# Patient Record
Sex: MALE | Race: WHITE | HISPANIC OR LATINO | Employment: UNEMPLOYED | URBAN - METROPOLITAN AREA
[De-identification: names, ages, dates, MRNs, and addresses within clinical notes are randomized per-mention and may not be internally consistent; named-entity substitution may affect disease eponyms.]

---

## 2023-09-01 ENCOUNTER — HOSPITAL ENCOUNTER (EMERGENCY)
Facility: HOSPITAL | Age: 23
Discharge: HOME | End: 2023-09-01
Attending: EMERGENCY MEDICINE
Payer: MEDICARE

## 2023-09-01 ENCOUNTER — APPOINTMENT (EMERGENCY)
Dept: RADIOLOGY | Facility: HOSPITAL | Age: 23
End: 2023-09-01
Payer: MEDICARE

## 2023-09-01 VITALS
DIASTOLIC BLOOD PRESSURE: 81 MMHG | RESPIRATION RATE: 20 BRPM | HEART RATE: 70 BPM | TEMPERATURE: 97.9 F | SYSTOLIC BLOOD PRESSURE: 146 MMHG | OXYGEN SATURATION: 99 %

## 2023-09-01 DIAGNOSIS — S92.152A AVULSION FRACTURE OF LEFT TALUS: Primary | ICD-10-CM

## 2023-09-01 PROCEDURE — 99283 EMERGENCY DEPT VISIT LOW MDM: CPT

## 2023-09-01 PROCEDURE — 99284 EMERGENCY DEPT VISIT MOD MDM: CPT | Performed by: PHYSICIAN ASSISTANT

## 2023-09-01 PROCEDURE — 29515 APPLICATION SHORT LEG SPLINT: CPT | Performed by: PHYSICIAN ASSISTANT

## 2023-09-01 PROCEDURE — 73610 X-RAY EXAM OF ANKLE: CPT

## 2023-09-01 NOTE — Clinical Note
Korin Lilly was seen and treated in our emergency department on 9/1/2023. may use crutches at work    Diagnosis:     Jeri Fitzgerald  may return to work on return date. He may return on this date: 09/02/2023         If you have any questions or concerns, please don't hesitate to call.       Mary Fernandez PA-C    ______________________________           _______________          _______________  Hospital Representative                              Date                                Time

## 2023-09-02 NOTE — ED PROVIDER NOTES
History  Chief Complaint   Patient presents with   • Ankle Pain     Pt reports he was playing basketball yesterday when he landed wrong and rolled his left ankle. Pt reports numbness of left ankle. 21yo male who presents to ER for evaluation of left ankle injury. Today while playing basketball he rolled inwards. Patient states he has a history of spraining his ankle multiple times in the past.  Patient is unable to bear weight on it, he has been using crutches. He took Tylenol which helps some. Pain radiated toward his knee yesterday this has since improved some. Admits to some swelling. Denies foot pain. Denies decree strength or sensation of the foot. Denies previous left ankle fracture or surgery. History provided by:  Patient      None       History reviewed. No pertinent past medical history. History reviewed. No pertinent surgical history. History reviewed. No pertinent family history. I have reviewed and agree with the history as documented. E-Cigarette/Vaping     E-Cigarette/Vaping Substances     Social History     Tobacco Use   • Smoking status: Never   • Smokeless tobacco: Never   Substance Use Topics   • Alcohol use: Never   • Drug use: Never       Review of Systems   Constitutional: Negative for chills and fever. Musculoskeletal: Positive for joint swelling. Skin: Negative for rash and wound. Neurological: Negative for weakness and numbness. Physical Exam  Physical Exam  Vitals and nursing note reviewed. Constitutional:       Appearance: Normal appearance. He is well-developed. HENT:      Head: Atraumatic. Eyes:      Conjunctiva/sclera: Conjunctivae normal.   Cardiovascular:      Pulses: Normal pulses. Musculoskeletal:      Left knee: Normal.      Left lower leg: Normal.      Left ankle: Swelling (mild) present. Tenderness present over the lateral malleolus and medial malleolus. No base of 5th metatarsal or proximal fibula tenderness.  Decreased range of motion. Normal pulse. Left Achilles Tendon: Normal.      Left foot: Normal.   Skin:     General: Skin is warm and dry. Capillary Refill: Capillary refill takes less than 2 seconds. Neurological:      Mental Status: He is alert. Psychiatric:         Mood and Affect: Mood normal.         Vital Signs  ED Triage Vitals [09/01/23 1957]   Temperature Pulse Respirations Blood Pressure SpO2   97.9 °F (36.6 °C) 70 20 146/81 99 %      Temp Source Heart Rate Source Patient Position - Orthostatic VS BP Location FiO2 (%)   Oral -- Sitting Left arm --      Pain Score       --           Vitals:    09/01/23 1957   BP: 146/81   Pulse: 70   Patient Position - Orthostatic VS: Sitting         Visual Acuity      ED Medications  Medications - No data to display    Diagnostic Studies  Results Reviewed     None                 XR ankle 3+ views LEFT   ED Interpretation by Parris Da Silva PA-C (09/01 2043)   ? Avulsion fracture of talus                 Procedures  Splint application    Date/Time: 9/1/2023 8:49 PM    Performed by: Parris Da Silva PA-C  Authorized by: Parris Da Silva PA-C  Universal Protocol:  Procedure performed by: Yohana Owen)  Consent given by: patient  Patient understanding: patient states understanding of the procedure being performed  Patient identity confirmed: verbally with patient      Pre-procedure details:     Sensation:  Normal  Procedure details:     Laterality:  Left    Location:  Ankle    Splint type:  Short leg    Supplies:  Ortho-Glass, cotton padding and elastic bandage  Post-procedure details:     Pain:  Improved    Sensation:  Normal    Patient tolerance of procedure: Tolerated well, no immediate complications             ED Course                                             Medical Decision Making  Differential diagnosis includes but is not limited to: Fracture versus sprain, will x-ray    Amount and/or Complexity of Data Reviewed  Radiology: ordered.           Disposition  Final diagnoses: Avulsion fracture of left talus     Time reflects when diagnosis was documented in both MDM as applicable and the Disposition within this note     Time User Action Codes Description Comment    9/1/2023  8:48 PM Anitha Bhta Add [K53.773L] Avulsion fracture of left talus       ED Disposition     ED Disposition   Discharge    Condition   Stable    Date/Time   Fri Sep 1, 2023  8:48 PM    Comment   Terrace Petite discharge to home/self care. Follow-up Information     Follow up With Specialties Details Why Contact Info Additional [address] Orthopedic Surgery In 3 days  [address]  [place] [address]  [address] [place] [address], [address] [place], [place], [address] [phone]    [facility] Emergency Department Emergency Medicine  If symptoms worsen [address] [id number] [address] [facility] Emergency Department          Patient's Medications    No medications on file       No discharge procedures on file.     PDMP Review     None          ED Provider  Electronically Signed by           Ayden Ocampo PA-C  09/01/23 5619

## 2023-09-02 NOTE — ED NOTES
This tech applied a short leg posterior to the left leg of this Pt. Pt handled application well with no signs of distress and educated on care of the splint.       [facility]  09/01/23 2057

## 2023-09-07 VITALS — WEIGHT: 167 LBS | SYSTOLIC BLOOD PRESSURE: 122 MMHG | HEART RATE: 72 BPM | DIASTOLIC BLOOD PRESSURE: 72 MMHG

## 2023-09-07 DIAGNOSIS — S93.492A SPRAIN OF ANTERIOR TALOFIBULAR LIGAMENT OF LEFT ANKLE, INITIAL ENCOUNTER: ICD-10-CM

## 2023-09-07 DIAGNOSIS — S92.152A CLOSED DISPLACED AVULSION FRACTURE OF LEFT TALUS, INITIAL ENCOUNTER: Primary | ICD-10-CM

## 2023-09-07 PROCEDURE — 99203 OFFICE O/P NEW LOW 30 MIN: CPT | Performed by: EMERGENCY MEDICINE

## 2023-09-07 RX ORDER — ALBUTEROL SULFATE 90 UG/1
2 AEROSOL, METERED RESPIRATORY (INHALATION) EVERY 6 HOURS PRN
COMMUNITY

## 2023-09-07 NOTE — PROGRESS NOTES
Assessment/Plan:    Diagnoses and all orders for this visit:    Closed displaced avulsion fracture of left talus, initial encounter  -     Ankle Cude ankle/Ankle Brace    Sprain of anterior talofibular ligament of left ankle, initial encounter  -     Ankle Cude ankle/Ankle Brace    Other orders  -     albuterol (PROVENTIL HFA,VENTOLIN HFA) 90 mcg/act inhaler; Inhale 2 puffs every 6 (six) hours as needed for wheezing    Lace up ankle brace  Ankle sprain exercises and information provided  Work note provided  Reviewed ER note and Xray imaging    Return in about 4 weeks (around 10/5/2023). 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, evaluated in ER 9/1 Xrays obtained provided posterior splint and utilizing knee scooter, has been working at [address] [place] [address],[address]    The following portions of the patient's chart were reviewed and updated as appropriate: Allergy:    Allergies   Allergen Reactions   • Ibuprofen Hives       Medications:    Current Outpatient Medications:   •  albuterol (PROVENTIL HFA,VENTOLIN HFA) 90 mcg/act inhaler, Inhale 2 puffs every 6 (six) hours as needed for wheezing, Disp: , Rfl:     Patient Active Problem List   Diagnosis   • Closed displaced avulsion fracture of left talus   • Sprain of anterior talofibular ligament of left ankle       Objective:  /72   Pulse 72   Wt 75.8 kg (167 lb)     Left Ankle Exam     Tenderness   The patient is experiencing tenderness in the ATF (dorsal talus). Swelling: mild    Range of Motion   Eversion: abnormal   Inversion: abnormal     Other   Erythema: absent  Sensation: normal  Pulse: present            Physical Exam      Neurologic Exam    Procedures    I have personally reviewed pertinent films in PACS. and I have personally reviewed the written report of the pertinent studies. History reviewed.  No pertinent past medical history. History reviewed. No pertinent surgical history. Social History     Socioeconomic History   • Marital status: Single     Spouse name: Not on file   • Number of children: Not on file   • Years of education: Not on file   • Highest education level: Not on file   Occupational History   • Not on file   Tobacco Use   • Smoking status: Never   • Smokeless tobacco: Never   Substance and Sexual Activity   • Alcohol use: Never   • Drug use: Never   • Sexual activity: Not on file   Other Topics Concern   • Not on file   Social History Narrative   • Not on file     Social Determinants of Health     Financial Resource Strain: Not on file   Food Insecurity: Not on file   Transportation Needs: Not on file   Physical Activity: Not on file   Stress: Not on file   Social Connections: Not on file   Intimate Partner Violence: Not on file   Housing Stability: Not on file       History reviewed. No pertinent family history.

## 2023-09-07 NOTE — PATIENT INSTRUCTIONS
You may use Advil (ibuprofen) 600mg every 6 hours or at least twice per day OR Aleve (naproxen) 250-500mg every 12 hours as needed for pain and inflammation. You may also take Tylenol 500mg every 4-6 hours as needed OR max 1,000mg per dose up to 3 times per day for a total of 3,000mg per day  Check with your primary care physician to see if these medications are safe to take and to make sure they do not interfere with your other medications and medical issues. Ankle Sprain   AMBULATORY CARE:   An ankle sprain  happens when 1 or more ligaments in your ankle joint stretch or tear. Ligaments are tough tissues that connect bones. Ligaments support your joints and keep your bones in place. Common symptoms include the following:   Trouble moving your ankle or foot    Pain when you touch or put weight on your ankle    Bruised, swollen, or misshapen ankle  Seek care immediately if:   You have severe pain in your ankle. Your foot or toes are cold or numb. Your ankle becomes more weak or unstable (wobbly). You are unable to put any weight on your ankle or foot. Your swelling has increased or returned. Contact your healthcare provider if:   Your pain does not go away, even after treatment. You have questions or concerns about your condition or care. Treatment:   Medicines      NSAIDs , such as ibuprofen, help decrease swelling, pain, and fever. This medicine is available with or without a doctor's order. NSAIDs can cause stomach bleeding or kidney problems in certain people. If you take blood thinner medicine, always ask your healthcare provider if NSAIDs are safe for you. Always read the medicine label and follow directions. Acetaminophen  decreases pain. It is available without a doctor's order. Ask how much to take and how often to take it. Follow directions. Acetaminophen can cause liver damage if not taken correctly. Prescription pain medicine  may be given.  Ask how to take this medicine safely. Surgery  may be needed to repair or replace a torn ligament if your sprain does not heal with other treatments. Your healthcare provider may use screws to attach the bones in your ankle together. The screws may help support your ankle and make it stable. Ask your healthcare provider for more information about surgery to treat your ankle sprain. Self care:   Use support devices,  such as a brace, cast, or splint, may be needed to limit your movement and protect your joint. You may need to use crutches to decrease your pain as you move around. Go to physical therapy as directed. A physical therapist teaches you exercises to help improve movement and strength, and to decrease pain. Rest  your ankle so that it can heal. Return to normal activities as directed. Apply ice on your ankle for 15 to 20 minutes every hour or as directed. Use an ice pack, or put crushed ice in a plastic bag. Cover it with a towel. Ice helps prevent tissue damage and decreases swelling and pain. Compress  your ankle. Ask if you should wrap an elastic bandage around your injured ligament. An elastic bandage provides support and helps decrease swelling and movement so your joint can heal. Wear as long as directed. Elevate  your ankle above the level of your heart as often as you can. This will help decrease swelling and pain. Prop your ankle on pillows or blankets to keep it elevated comfortably. Prevent another ankle sprain:   Let your ankle heal.  Find out how long your ligament needs to heal. Do not do any physical activity until your healthcare provider says it is okay. If you start activity too soon, you may develop a more serious injury. Always warm up and stretch  before you exercise or play sports. Use the right equipment. Always wear shoes that fit well and are made for the activity that you are doing. You may also need ankle supports, elbow and knee pads, or braces.   Follow up with your healthcare provider as directed:  Write down your questions so you remember to ask them during your visits. © 2017 [facility] [employer] Information is for End User's use only and may not be sold, redistributed or otherwise used for commercial purposes. All illustrations and images included in CareNotes® are the copyrighted property of [employer]. or [employer]in [employer]. The above information is an  only. It is not intended as medical advice for individual conditions or treatments. Talk to your doctor, nurse or pharmacist before following any medical regimen to see if it is safe and effective for you. Ankle Exercises   AMBULATORY CARE:   What you need to know about ankle exercises: Ankle exercises help strengthen your ankle and improve its function after injury. These are beginning exercises. Ask your healthcare provider if you need to see a physical therapist for more advanced exercises. Do these exercises 3 to 5 days a week , or as directed by your healthcare provider. Ask if you should perform the exercises on each ankle. Do the exercises in the order that your healthcare provider recommends. This will help prevent swelling, chronic pain, and reinjury. Start with range of motion exercises. Then progress to strengthening exercises, and finally to balancing exercises. Warm up before you do ankle exercises. Walk or ride a stationary bike for 5 to 10 minutes to prepare your ankle for movement. Stop if you feel pain. It is normal to feel some discomfort at first. Regular exercise will help decrease your discomfort over time. How to perform range of motion exercises safely:  Begin with range of motion exercises to improve flexibility. Ask your healthcare provider when you can progress to strengthening exercises. Ankle alphabet:  Sit on a chair so that your feet do not touch the floor. Use your big toe to write each letter of the alphabet.  Use only your foot and ankle, and keep your movements small. Do 2 sets. Calf stretches:      Sitting calf stretches with a towel:  Sit on the floor with both legs out straight in front of you. Loop a towel around the ball of your injured foot. Grasp the ends of the towel and pull it toward you. Keep your leg and back straight. Do not lean forward as you pull the towel. Hold for 30 seconds. Then relax for 30 seconds. Do 2 sets of 10. Standing calf stretches:  Stand facing a wall with the foot that is not injured forward and your knee slightly bent. Keep the leg with the injured foot straight and behind you with your toes pointed in slightly. With both heels flat on the floor, press your hips forward. Do not arch your back. Hold for 30 seconds, and then relax for 30 seconds. Do 2 sets of 10. Repeat with your leg bent. Do 2 sets of 10. How to perform strengthening exercises safely:  After you can perform range of motion exercises without pain, you may begin strengthening exercises. Ask your healthcare provider when you can progress to balancing exercises. Ankle movement in 4 directions:  Sit on the floor with your legs straight in front of you. Keep your heels on the floor for support. Dorsiflexion:  Begin with your toes pointing straight up. Pull your toes toward your body. Slowly return to the starting position. Do 3 sets of 5. Plantar flexion:  Begin with your toes pointing straight up. Push your toes away from your body. Slowly return to the starting position. Do 3 sets of 5. Inversion:  Begin with your toes pointing straight up. Push your toes inward, toward each other. Slowly return to the starting position. Do 3 sets of 5. Eversion:  Begin with your toes pointing straight up. Push your toes outward, away from each other. Slowly return to the starting position. Do 3 sets of 5. Toe curls with a towel:  Sit on a chair so that both of your feet are flat on the floor.  Place a small towel on the floor in front of your injured foot. Grab the center of the towel with your toes and curl the towel toward you. Relax and repeat. Do 1 set of 5. Marina pick-ups:  Sit on a chair so that both of your feet are flat on the floor. Place 20 marbles on the floor in front of your injured foot. Use your toes to  one marble at a time and place it into a bowl. Repeat until you have picked up all the marbles. Do 1 set. Heel raises:      Single leg heel raises:  Stand with your weight evenly on both feet. Hold on to a chair or a wall for balance. Lift the foot that is not injured off the floor so all your weight is placed on your injured foot. Raise the heel of your injured foot as high as you can. Slowly lower your heel to the floor. Do 1 set of 10. Double leg heel raises:  Stand with your weight evenly on both feet. Hold on to a chair or a wall for balance. Raise both of your heels as high as you can. Slowly lower your heels to the floor. Do 1 set of 10. Heel and toe walks:      Heel walks:  Begin in a standing position. Lift your toes off the floor and walk on your heels. Keep your toes lifted as high as possible. Do 2 sets of 10. Toe walks:  Begin in a standing position. Lift your heels off the floor and walk on the balls and toes of your feet. Keep your heels lifted as high as possible. Do 2 sets of 10. How to perform a balance exercise safely:  After you can perform strengthening exercises without pain, you may do this beginning balancing exercise. Ask your healthcare provider for more advanced balance exercises. Single leg stance:  Stand with your weight evenly on both feet, or hold on to a chair or a wall. Do not lean to the side. Lift the foot that is not injured off the floor so all your weight is placed on your injured foot. Balance on your injured foot. Ask your healthcare provider how long to hold this position.            Contact your healthcare provider if: Your pain becomes worse. You have new pain. You have questions or concerns about your condition, care, or exercise program.  © 2017 [facility] [employer] Information is for End User's use only and may not be sold, redistributed or otherwise used for commercial purposes. All illustrations and images included in CareNotes® are the copyrighted property of [facility], [employer]. or Davin Acosta. The above information is an  only. It is not intended as medical advice for individual conditions or treatments. Talk to your doctor, nurse or pharmacist before following any medical regimen to see if it is safe and effective for you.

## 2023-09-07 NOTE — LETTER
September 7, 2023     Patient: Sagrario Bravo  YOB: 2000  Date of Visit: 9/7/2023      To Whom it May Concern:    Sagrario Bravo is under my professional care. Linh Wilson was seen in my office on 9/7/2023. Linh Wilson may weight bear as tolerated and return to work, please allow ankle brace and knee scooter as needed. May drive. F/U 4 weeks. If you have any questions or concerns, please don't hesitate to call.          Sincerely,          Lanie Mcgee MD        CC: No Recipients

## 2024-11-18 ENCOUNTER — HOSPITAL ENCOUNTER (EMERGENCY)
Facility: HOSPITAL | Age: 24
Discharge: HOME | End: 2024-11-18

## 2024-11-18 VITALS
HEART RATE: 86 BPM | DIASTOLIC BLOOD PRESSURE: 83 MMHG | OXYGEN SATURATION: 100 % | TEMPERATURE: 97.8 F | WEIGHT: 167.55 LBS | SYSTOLIC BLOOD PRESSURE: 119 MMHG | RESPIRATION RATE: 18 BRPM

## 2024-11-18 DIAGNOSIS — R11.2 NAUSEA AND VOMITING: ICD-10-CM

## 2024-11-18 DIAGNOSIS — R10.84 ABDOMINAL PAIN, GENERALIZED: Primary | ICD-10-CM

## 2024-11-18 DIAGNOSIS — R19.7 DIARRHEA: ICD-10-CM

## 2024-11-18 LAB
ALBUMIN SERPL BCG-MCNC: 5.4 G/DL (ref 3.5–5)
ALP SERPL-CCNC: 66 U/L (ref 34–104)
ALT SERPL W P-5'-P-CCNC: 17 U/L (ref 7–52)
ANION GAP SERPL CALCULATED.3IONS-SCNC: 10 MMOL/L (ref 4–13)
AST SERPL W P-5'-P-CCNC: 18 U/L (ref 13–39)
BILIRUB SERPL-MCNC: 2.12 MG/DL (ref 0.2–1)
BUN SERPL-MCNC: 20 MG/DL (ref 5–25)
CALCIUM SERPL-MCNC: 10.4 MG/DL (ref 8.4–10.2)
CHLORIDE SERPL-SCNC: 100 MMOL/L (ref 96–108)
CO2 SERPL-SCNC: 28 MMOL/L (ref 21–32)
CREAT SERPL-MCNC: 0.92 MG/DL (ref 0.6–1.3)
ERYTHROCYTE [DISTWIDTH] IN BLOOD BY AUTOMATED COUNT: 11.8 % (ref 11.6–15.1)
GFR SERPL CREATININE-BSD FRML MDRD: 117 ML/MIN/1.73SQ M
GLUCOSE SERPL-MCNC: 95 MG/DL (ref 65–140)
HCT VFR BLD AUTO: 52.9 % (ref 36.5–49.3)
HGB BLD-MCNC: 18.2 G/DL (ref 12–17)
LIPASE SERPL-CCNC: 14 U/L (ref 11–82)
MCH RBC QN AUTO: 31.1 PG (ref 26.8–34.3)
MCHC RBC AUTO-ENTMCNC: 34.4 G/DL (ref 31.4–37.4)
MCV RBC AUTO: 90 FL (ref 82–98)
PLATELET # BLD AUTO: 294 THOUSANDS/UL (ref 149–390)
PMV BLD AUTO: 9.7 FL (ref 8.9–12.7)
POTASSIUM SERPL-SCNC: 3.8 MMOL/L (ref 3.5–5.3)
PROT SERPL-MCNC: 9.2 G/DL (ref 6.4–8.4)
RBC # BLD AUTO: 5.86 MILLION/UL (ref 3.88–5.62)
SODIUM SERPL-SCNC: 138 MMOL/L (ref 135–147)
WBC # BLD AUTO: 11.61 THOUSAND/UL (ref 4.31–10.16)

## 2024-11-18 PROCEDURE — 96374 THER/PROPH/DIAG INJ IV PUSH: CPT

## 2024-11-18 PROCEDURE — 99284 EMERGENCY DEPT VISIT MOD MDM: CPT

## 2024-11-18 PROCEDURE — 96361 HYDRATE IV INFUSION ADD-ON: CPT

## 2024-11-18 PROCEDURE — 99283 EMERGENCY DEPT VISIT LOW MDM: CPT

## 2024-11-18 PROCEDURE — 80053 COMPREHEN METABOLIC PANEL: CPT

## 2024-11-18 PROCEDURE — 36415 COLL VENOUS BLD VENIPUNCTURE: CPT

## 2024-11-18 PROCEDURE — 83690 ASSAY OF LIPASE: CPT

## 2024-11-18 PROCEDURE — 85025 COMPLETE CBC W/AUTO DIFF WBC: CPT

## 2024-11-18 RX ORDER — LOPERAMIDE HYDROCHLORIDE 2 MG/1
2 CAPSULE ORAL 4 TIMES DAILY PRN
Qty: 12 CAPSULE | Refills: 0 | Status: SHIPPED | OUTPATIENT
Start: 2024-11-18

## 2024-11-18 RX ORDER — ONDANSETRON 2 MG/ML
4 INJECTION INTRAMUSCULAR; INTRAVENOUS ONCE
Status: COMPLETED | OUTPATIENT
Start: 2024-11-18 | End: 2024-11-18

## 2024-11-18 RX ORDER — LOPERAMIDE HYDROCHLORIDE 2 MG/1
4 CAPSULE ORAL ONCE
Status: COMPLETED | OUTPATIENT
Start: 2024-11-18 | End: 2024-11-18

## 2024-11-18 RX ORDER — DICYCLOMINE HCL 20 MG
20 TABLET ORAL ONCE
Status: COMPLETED | OUTPATIENT
Start: 2024-11-18 | End: 2024-11-18

## 2024-11-18 RX ORDER — ONDANSETRON 4 MG/1
4 TABLET, ORALLY DISINTEGRATING ORAL EVERY 6 HOURS PRN
Qty: 15 TABLET | Refills: 0 | Status: SHIPPED | OUTPATIENT
Start: 2024-11-18

## 2024-11-18 RX ADMIN — DICYCLOMINE HYDROCHLORIDE 20 MG: 20 TABLET ORAL at 13:58

## 2024-11-18 RX ADMIN — LOPERAMIDE HYDROCHLORIDE 4 MG: 2 CAPSULE ORAL at 13:57

## 2024-11-18 RX ADMIN — SODIUM CHLORIDE 1000 ML: 0.9 INJECTION, SOLUTION INTRAVENOUS at 13:59

## 2024-11-18 RX ADMIN — ONDANSETRON 4 MG: 2 INJECTION INTRAMUSCULAR; INTRAVENOUS at 13:59

## 2024-11-18 NOTE — ED PROVIDER NOTES
Time reflects when diagnosis was documented in both MDM as applicable and the Disposition within this note       Time User Action Codes Description Comment    11/18/2024  3:07 PM Jose Alberto Espinosa [R11.2] Nausea and vomiting     11/18/2024  3:07 PM Jose Alberto Espinosa [R19.7] Diarrhea     11/18/2024  3:07 PM Jose Alberto Espinosa [R10.84] Abdominal pain, generalized           ED Disposition       ED Disposition   Discharge    Condition   Stable    Date/Time   Mon Nov 18, 2024  3:07 PM    Comment   Rojelio Alamo discharge to home/self care.                   Assessment & Plan       Medical Decision Making  Amount and/or Complexity of Data Reviewed  Labs: ordered. Decision-making details documented in ED Course.    Risk  Prescription drug management.    25 y/o M with acute n/v/d. VSS. Likely viral gastroenteritis or alcohol related. Pt appears dry on exam and labs also show mild dehydration. Pt feeling better with fluids, antiemetic. Will dc with supportive home care, RTED precautions given.     ED Course as of 11/19/24 0947   Mon Nov 18, 2024   1434 Hemoglobin(!): 18.2  Likely dehydration related   1504 Total Bilirubin(!): 2.12  Likely dehydration        Medications   sodium chloride 0.9 % bolus 1,000 mL (0 mL Intravenous Stopped 11/18/24 1510)   ondansetron (ZOFRAN) injection 4 mg (4 mg Intravenous Given 11/18/24 1359)   dicyclomine (BENTYL) tablet 20 mg (20 mg Oral Given 11/18/24 1358)   loperamide (IMODIUM) capsule 4 mg (4 mg Oral Given 11/18/24 1357)       ED Risk Strat Scores                           SBIRT 20yo+      Flowsheet Row Most Recent Value   Initial Alcohol Screen: US AUDIT-C     1. How often do you have a drink containing alcohol? 0 Filed at: 11/18/2024 1510   2. How many drinks containing alcohol do you have on a typical day you are drinking?  0 Filed at: 11/18/2024 1510   3a. Male UNDER 65: How often do you have five or more drinks on one occasion? 0 Filed at: 11/18/2024 1510   3b. FEMALE Any Age,  or MALE 65+: How often do you have 4 or more drinks on one occassion? 0 Filed at: 11/18/2024 1510   Audit-C Score 0 Filed at: 11/18/2024 1510   EPHRAIM: How many times in the past year have you...    Used an illegal drug or used a prescription medication for non-medical reasons? Never Filed at: 11/18/2024 1510                            History of Present Illness       Chief Complaint   Patient presents with    Vomiting     Started last night with epigastric pain and vomiting, now diarrhea as well       Past Medical History:   Diagnosis Date    Asthma       History reviewed. No pertinent surgical history.   History reviewed. No pertinent family history.   Social History     Tobacco Use    Smoking status: Never    Smokeless tobacco: Never   Substance Use Topics    Alcohol use: Yes     Comment: occ    Drug use: Never      E-Cigarette/Vaping      E-Cigarette/Vaping Substances      I have reviewed and agree with the history as documented.     HPI    23 y/o M drank etoh binge 2 days ago, hungover yest, last night severe abd pain, n/v/d ongoing today. Multiple episodes, non bloody emesis or diarrhea. No fever, chills, recent travel, cp, sob.     Review of Systems   Constitutional:  Negative for chills and fever.   HENT:  Negative for ear pain and sore throat.    Eyes:  Negative for pain and visual disturbance.   Respiratory:  Negative for cough and shortness of breath.    Cardiovascular:  Negative for chest pain and palpitations.   Gastrointestinal:  Positive for abdominal pain, diarrhea, nausea and vomiting.   Genitourinary:  Negative for dysuria and hematuria.   Musculoskeletal:  Negative for arthralgias and back pain.   Skin:  Negative for color change and rash.   Neurological:  Negative for seizures and syncope.   All other systems reviewed and are negative.          Objective       ED Triage Vitals [11/18/24 1217]   Temperature Pulse Blood Pressure Respirations SpO2 Patient Position - Orthostatic VS   97.8 °F (36.6 °C)  86 119/83 18 100 % Sitting      Temp Source Heart Rate Source BP Location FiO2 (%) Pain Score    Oral Monitor Left arm -- --      Vitals      Date and Time Temp Pulse SpO2 Resp BP Pain Score FACES Pain Rating User   11/18/24 1217 97.8 °F (36.6 °C) 86 100 % 18 119/83 -- -- JN            Physical Exam  Vitals and nursing note reviewed.   Constitutional:       General: He is not in acute distress.     Appearance: He is well-developed. He is not toxic-appearing.   HENT:      Head: Normocephalic and atraumatic.      Right Ear: External ear normal.      Left Ear: External ear normal.      Nose: Nose normal.      Mouth/Throat:      Mouth: Mucous membranes are dry.      Pharynx: Oropharynx is clear. No oropharyngeal exudate or posterior oropharyngeal erythema.   Eyes:      Extraocular Movements: Extraocular movements intact.      Conjunctiva/sclera: Conjunctivae normal.      Pupils: Pupils are equal, round, and reactive to light.   Cardiovascular:      Rate and Rhythm: Normal rate and regular rhythm.      Pulses: Normal pulses.      Heart sounds: Normal heart sounds. No murmur heard.     No friction rub. No gallop.   Pulmonary:      Effort: Pulmonary effort is normal. No respiratory distress.      Breath sounds: Normal breath sounds. No wheezing, rhonchi or rales.   Abdominal:      General: Abdomen is flat.      Palpations: Abdomen is soft.      Tenderness: There is abdominal tenderness. There is no guarding or rebound.      Comments: Mild generalized ttp   Musculoskeletal:         General: Normal range of motion.      Cervical back: Normal range of motion. No rigidity.      Right lower leg: No edema.      Left lower leg: No edema.   Skin:     General: Skin is warm and dry.      Capillary Refill: Capillary refill takes less than 2 seconds.   Neurological:      General: No focal deficit present.      Mental Status: He is alert.   Psychiatric:         Mood and Affect: Mood normal.         Results Reviewed       Procedure  Component Value Units Date/Time    Comprehensive metabolic panel [[id number]]  (Abnormal) Collected: 11/18/24 1358    Lab Status: Final result Specimen: Blood from Arm, Right Updated: 11/18/24 1500     Sodium 138 mmol/L      Potassium 3.8 mmol/L      Chloride 100 mmol/L      CO2 28 mmol/L      ANION GAP 10 mmol/L      BUN 20 mg/dL      Creatinine 0.92 mg/dL      Glucose 95 mg/dL      Calcium 10.4 mg/dL      AST 18 U/L      ALT 17 U/L      Alkaline Phosphatase 66 U/L      Total Protein 9.2 g/dL      Albumin 5.4 g/dL      Total Bilirubin 2.12 mg/dL      eGFR 117 ml/min/1.73sq m     Narrative:      National Kid[employer] guidelines for Chronic Kidney Disease (CKD):     Stage 1 with normal or high GFR (GFR > 90 mL/min/1.73 square meters)    Stage 2 Mild CKD (GFR = 60-89 mL/min/1.73 square meters)    Stage 3A Moderate CKD (GFR = 45-59 mL/min/1.73 square meters)    Stage 3B Moderate CKD (GFR = 30-44 mL/min/1.73 square meters)    Stage 4 Severe CKD (GFR = 15-29 mL/min/1.73 square meters)    Stage 5 End Stage CKD (GFR <15 mL/min/1.73 square meters)  Note: GFR calculation is accurate only with a steady state creatinine    Lipase [[id number]]  (Normal) Collected: 11/18/24 1358    Lab Status: Final result Specimen: Blood from Arm, Right Updated: 11/18/24 1500     Lipase 14 u/L     CBC and differential [[id number]]  (Abnormal) Collected: 11/18/24 1358    Lab Status: Final result Specimen: Blood from Arm, Right Updated: 11/18/24 1431     WBC 11.61 Thousand/uL      RBC 5.86 Million/uL      Hemoglobin 18.2 g/dL      Hematocrit 52.9 %      MCV 90 fL      MCH 31.1 pg      MCHC 34.4 g/dL      RDW 11.8 %      MPV 9.7 fL      Platelets 294 Thousands/uL     Narrative:      This is an appended report.  These results have been appended to a previously verified report.            No orders to display       Procedures    ED Medication and Procedure Management   Prior to Admission Medications   Prescriptions Last Dose Informant  Patient Reported? Taking?   albuterol (PROVENTIL HFA,VENTOLIN HFA) 90 mcg/act inhaler   Yes No   Sig: Inhale 2 puffs every 6 (six) hours as needed for wheezing      Facility-Administered Medications: None     Discharge Medication List as of 11/18/2024  3:08 PM        START taking these medications    Details   loperamide (IMODIUM) 2 mg capsule Take 1 capsule (2 mg total) by mouth 4 (four) times a day as needed for diarrhea, Starting Mon 11/18/2024, Normal      ondansetron (ZOFRAN-ODT) 4 mg disintegrating tablet Take 1 tablet (4 mg total) by mouth every 6 (six) hours as needed for nausea, Starting Mon 11/18/2024, Normal           CONTINUE these medications which have NOT CHANGED    Details   albuterol (PROVENTIL HFA,VENTOLIN HFA) 90 mcg/act inhaler Inhale 2 puffs every 6 (six) hours as needed for wheezing, Historical Med           No discharge procedures on file.  ED SEPSIS DOCUMENTATION   Time reflects when diagnosis was documented in both MDM as applicable and the Disposition within this note       Time User Action Codes Description Comment    11/18/2024  3:07 PM Jose Alberto Espinosa [R11.2] Nausea and vomiting     11/18/2024  3:07 PM Jose Alberto Espinosa [R19.7] Diarrhea     11/18/2024  3:07 PM Jose Alberto Espinosa [R10.84] Abdominal pain, generalized                  Jose Alberto Espinosa MD  11/19/24 0949

## 2025-05-19 ENCOUNTER — HOSPITAL ENCOUNTER (EMERGENCY)
Facility: HOSPITAL | Age: 25
Discharge: HOME | End: 2025-05-19
Attending: EMERGENCY MEDICINE
Payer: MEDICARE

## 2025-05-19 ENCOUNTER — APPOINTMENT (EMERGENCY)
Dept: RADIOLOGY | Facility: HOSPITAL | Age: 25
End: 2025-05-19
Payer: MEDICARE

## 2025-05-19 VITALS
DIASTOLIC BLOOD PRESSURE: 83 MMHG | HEART RATE: 61 BPM | RESPIRATION RATE: 18 BRPM | OXYGEN SATURATION: 99 % | SYSTOLIC BLOOD PRESSURE: 149 MMHG | TEMPERATURE: 98.6 F | WEIGHT: 183.64 LBS

## 2025-05-19 DIAGNOSIS — R09.81 NASAL CONGESTION: Primary | ICD-10-CM

## 2025-05-19 DIAGNOSIS — J32.9 SINUSITIS: ICD-10-CM

## 2025-05-19 DIAGNOSIS — J02.9 SORE THROAT: ICD-10-CM

## 2025-05-19 DIAGNOSIS — R05.9 COUGH: ICD-10-CM

## 2025-05-19 PROCEDURE — 99283 EMERGENCY DEPT VISIT LOW MDM: CPT

## 2025-05-19 PROCEDURE — 71046 X-RAY EXAM CHEST 2 VIEWS: CPT

## 2025-05-19 PROCEDURE — 99284 EMERGENCY DEPT VISIT MOD MDM: CPT

## 2025-05-19 RX ADMIN — AMOXICILLIN AND CLAVULANATE POTASSIUM 1 TABLET: 875; 125 TABLET, FILM COATED ORAL at 20:28

## 2025-05-20 NOTE — DISCHARGE INSTRUCTIONS
Today I provided prescription for Augmentin.  Augmentin is an antibiotic used to treat suspected sinus infection.  Take as directed.  Take 1 dose in the morning, 1 dose in the evening for the next 7 days.  For the nasal congestion you can consider obtaining Mucinex.  The generic form of Mucinex at the pharmacy is just as good.    See attached instructions for how to use the Flonase nasal spray.  Often times the positioning of the spray bottle is the key to success with the Flonase spray.   Try to keep your windows shut at night.  Often times the pollen can get into your living space at night with open windows.  Reducing your pollen exposure can improve allergy symptoms.  Follow-up with your primary care provider for continued monitoring of symptoms.    How to Use Fluticasone Propionate Nasal Spray  Fluticasone propionate nasal spray is a corticosteroid used to treat nasal symptoms such as congestion, runny nose, sneezing, and itchy nose caused by allergies.   Here's how to use it correctly:  1. Prepare the Spray:  Shake Well: Shake the bottle gently before each use.  Prime (if needed):  If it's a new bottle or if you haven't used the spray for a week or more, you'll need to prime the pump.  Remove the dust cover and point the bottle away from your face.  Press the white nozzle repeatedly until a fine mist appears.   2. Clear Nasal Passages:  Gently blow your nose to clear your nostrils.   3. Administer the Spray:  Positioning:  Tilt your head slightly forward and close one nostril with a finger.  Insert the nozzle tip into the other nostril, aiming slightly away from the center of your nose.  Spray and Breathe:  Start breathing in gently through your nose.  While breathing in, press the nozzle to release a spray.  Continue breathing in gently through the nostril.  Breathe out through your mouth.  Repeat if needed: If your doctor or the instructions indicate using two sprays per nostril, repeat the process.  Other  nostril: Repeat the same process for the other nostril.

## 2025-05-20 NOTE — ED PROVIDER NOTES
Time reflects when diagnosis was documented in both MDM as applicable and the Disposition within this note       Time User Action Codes Description Comment    5/19/2025  8:41 PM Tremaine Avila [R09.81] Nasal congestion     5/19/2025  8:41 PM Tremaine Avila [J32.9] Sinusitis     5/19/2025  8:41 PM Tremaine Avila Add [R05.9] Cough     5/19/2025  8:42 PM Tremaine Avila Add [J02.9] Sore throat           ED Disposition       ED Disposition   Discharge    Condition   Stable    Date/Time   Mon May 19, 2025  8:41 PM    Comment   Rojelio Alamo discharge to home/self care.                   Assessment & Plan       Medical Decision Making  24-year-old male presents to ED for evaluation of nasal congestion, cough, sore throat, headache, sinus pressure as seen in HPI.  On physical examination patient vital signs stable.  Normotensive.  Afebrile.  Nontachycardic.  Nonhypoxic.  No respiratory distress.  No murmur.  Normal breath sounds.  Nasal congestion present.  Oropharynx with erythema, no exudate.  No rash.  Ambulating independently.  Moving all 4 extremities.  Frontal sinus tenderness present.  Obtained chest x-ray which returned without evidence of pneumonia or other acute cardiopulmonary process.  Discussed treatment options for nasal congestion.  Advised against using Afrin for greater than 3 days.  Advised to trialing the Flonase again with attention on where he is aiming the nozzle in his nose.  Patient can use Mucinex for nasal congestion.  Given that symptoms have been present for a month and are consistent with a sinus infection, will treat with Augmentin for possible bacterial sinusitis.  First dose given in the ED.  Prescription sent to patient's preferred pharmacy.  Advised to follow-up with PCP as needed.  Return precautions discussed.  Patient discharged.    Prior to discharge, discharge instructions were discussed with patient at bedside. Patient was provided both verbal and written instructions. Patient  "is understanding of the discharge instructions and is agreeable to plan of care. Return precautions were discussed with patient bedside, patient verbalized understanding of signs and symptoms that would necessitate return to the ED. All questions were answered. Patient was comfortable with the plan of care and discharged to home.    Portions of this chart may have been written with voice recognition software.  Occasional grammatical errors, wrong word or \"sound a like\" substitutions may have occurred due to software limitations.  Please read carefully and use context to recognize where substitutions have occurred.     Amount and/or Complexity of Data Reviewed  Radiology: ordered and independent interpretation performed.    Risk  Prescription drug management.             Medications   amoxicillin-clavulanate (AUGMENTIN) 875-125 mg per tablet 1 tablet (1 tablet Oral Given 5/19/25 2028)       ED Risk Strat Scores                    No data recorded                            History of Present Illness       Chief Complaint   Patient presents with    Nasal Congestion     Pt c/o nasal congestion, eye redness/irritation, sore throat x1 month. Pt reports taking otc allergy medicine claritin, zyrtec, etc. As well as cold medicine w/o relief. Pt reports he feels like his vision gets blurry at times and his throat feels tight. Pt reports intermittent nausea as well. Denies CP/SOB.        Past Medical History:   Diagnosis Date    Asthma       History reviewed. No pertinent surgical history.   History reviewed. No pertinent family history.   Social History[1]   E-Cigarette/Vaping    E-Cigarette Use Current Some Day User       E-Cigarette/Vaping Substances    Nicotine Yes     Flavoring Yes       I have reviewed and agree with the history as documented.     24-year-old male with no pertinent medical history presents to ED for evaluation of nasal congestion, eye irritation, sore throat, sinus pressure, cough.  Patient states that " symptoms have been present for the past month.  He has been treating them as seasonal allergies but the symptoms persist.  No known sick contacts.  He did try Afrin for couple days.  Initially it helped with the nasal congestion but it stopped working.  He tried Flonase which did not  help though he possibly was not aiming the Flonase in the right direction.  Does not sleep with the windows open in his room.  No new animals in his household.  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  Denies fever, chest pain, shortness of breath, seizure, syncope, abdominal pain, urinary symptoms.                 Review of Systems   HENT:  Positive for congestion, sinus pressure, sinus pain and sore throat.    Respiratory:  Positive for cough.    All other systems reviewed and are negative.          Objective       ED Triage Vitals [05/19/25 2001]   Temperature Pulse Blood Pressure Respirations SpO2 Patient Position - Orthostatic VS   98.6 °F (37 °C) 61 149/83 18 99 % Lying      Temp Source Heart Rate Source BP Location FiO2 (%) Pain Score    Oral Monitor Left arm -- --      Vitals      Date and Time Temp Pulse SpO2 Resp BP Pain Score FACES Pain Rating User   05/19/25 2001 98.6 °F (37 °C) 61 99 % 18 149/83 -- -- SVEN            Physical Exam  Vitals and nursing note reviewed.   Constitutional:       General: He is not in acute distress.     Appearance: Normal appearance. He is well-developed. He is not toxic-appearing or diaphoretic.   HENT:      Head: Normocephalic and atraumatic.      Nose: Congestion present.      Right Sinus: Frontal sinus tenderness present.      Left Sinus: Frontal sinus tenderness present.      Mouth/Throat:      Pharynx: Posterior oropharyngeal erythema present. No oropharyngeal exudate.     Eyes:      General: No scleral icterus.        Right eye: No discharge.         Left eye: No discharge.      Extraocular Movements: Extraocular movements intact.      Conjunctiva/sclera: Conjunctivae normal.       Pupils: Pupils are equal, round, and reactive to light.       Cardiovascular:      Rate and Rhythm: Normal rate and regular rhythm.      Pulses: Normal pulses.      Heart sounds: Normal heart sounds. No murmur heard.     No friction rub. No gallop.   Pulmonary:      Effort: Pulmonary effort is normal. No respiratory distress.      Breath sounds: No stridor. No wheezing, rhonchi or rales.   Abdominal:      Palpations: Abdomen is soft.      Tenderness: There is no abdominal tenderness.     Musculoskeletal:         General: No swelling.      Cervical back: Neck supple.     Skin:     General: Skin is warm and dry.      Capillary Refill: Capillary refill takes less than 2 seconds.      Coloration: Skin is not jaundiced or pale.      Findings: No rash.     Neurological:      General: No focal deficit present.      Mental Status: He is alert and oriented to person, place, and time. Mental status is at baseline.     Psychiatric:         Mood and Affect: Mood normal.         Results Reviewed       None            XR chest 2 views   ED Interpretation by Tremaine Avila PA-C (05/19 2042)   No acute cardiopulmonary disease on personal interpretation       Final Interpretation by Juan Manuel Valentino MD (05/20 0819)      No acute cardiopulmonary disease.      This report is in agreement with the preliminary interpretation.         Workstation performed: [id number]             Procedures    ED Medication and Procedure Management   Prior to Admission Medications   Prescriptions Last Dose Informant Patient Reported? Taking?   albuterol (PROVENTIL HFA,VENTOLIN HFA) 90 mcg/act inhaler   Yes No   Sig: Inhale 2 puffs every 6 (six) hours as needed for wheezing   loperamide (IMODIUM) 2 mg capsule   No No   Sig: Take 1 capsule (2 mg total) by mouth 4 (four) times a day as needed for diarrhea   ondansetron (ZOFRAN-ODT) 4 mg disintegrating tablet   No No   Sig: Take 1 tablet (4 mg total) by mouth every 6 (six) hours as needed for  nausea      Facility-Administered Medications: None     Discharge Medication List as of 5/19/2025  8:48 PM        START taking these medications    Details   amoxicillin-clavulanate (AUGMENTIN) 875-125 mg per tablet Take 1 tablet by mouth every 12 (twelve) hours for 7 days, Starting Mon 5/19/2025, Until Mon 5/26/2025, Normal           CONTINUE these medications which have NOT CHANGED    Details   albuterol (PROVENTIL HFA,VENTOLIN HFA) 90 mcg/act inhaler Inhale 2 puffs every 6 (six) hours as needed for wheezing, Historical Med      loperamide (IMODIUM) 2 mg capsule Take 1 capsule (2 mg total) by mouth 4 (four) times a day as needed for diarrhea, Starting Mon 11/18/2024, Normal      ondansetron (ZOFRAN-ODT) 4 mg disintegrating tablet Take 1 tablet (4 mg total) by mouth every 6 (six) hours as needed for nausea, Starting Mon 11/18/2024, Normal           No discharge procedures on file.  ED SEPSIS DOCUMENTATION   Time reflects when diagnosis was documented in both MDM as applicable and the Disposition within this note       Time User Action Codes Description Comment    5/19/2025  8:41 PM Tremaine Avila [R09.81] Nasal congestion     5/19/2025  8:41 PM Tremaine Avila [J32.9] Sinusitis     5/19/2025  8:41 PM Tremaine Avila [R05.9] Cough     5/19/2025  8:42 PM Tremaine Avila [J02.9] Sore throat                      [1]   Social History  Tobacco Use    Smoking status: Never    Smokeless tobacco: Never   Vaping Use    Vaping status: Some Days    Substances: Nicotine, Flavoring   Substance Use Topics    Alcohol use: Yes     Comment: occ    Drug use: Never        Tremaine Avila PA-C  05/21/25 0404